# Patient Record
Sex: FEMALE | Race: WHITE | NOT HISPANIC OR LATINO | URBAN - METROPOLITAN AREA
[De-identification: names, ages, dates, MRNs, and addresses within clinical notes are randomized per-mention and may not be internally consistent; named-entity substitution may affect disease eponyms.]

---

## 2023-05-11 ENCOUNTER — OFFICE VISIT (OUTPATIENT)
Dept: URBAN - METROPOLITAN AREA CLINIC 78 | Facility: CLINIC | Age: 58
End: 2023-05-11
Payer: SELF-PAY

## 2023-05-11 VITALS
SYSTOLIC BLOOD PRESSURE: 100 MMHG | HEART RATE: 76 BPM | WEIGHT: 124.8 LBS | RESPIRATION RATE: 16 BRPM | TEMPERATURE: 98.2 F | BODY MASS INDEX: 22.97 KG/M2 | DIASTOLIC BLOOD PRESSURE: 66 MMHG | HEIGHT: 62 IN

## 2023-05-11 DIAGNOSIS — D50.8 ACQUIRED IRON DEFICIENCY ANEMIA DUE TO DECREASED ABSORPTION: ICD-10-CM

## 2023-05-11 DIAGNOSIS — R10.84 ABDOMINAL CRAMPING, GENERALIZED: ICD-10-CM

## 2023-05-11 DIAGNOSIS — R43.2 DYSGEUSIA: ICD-10-CM

## 2023-05-11 DIAGNOSIS — R63.4 UNINTENTIONAL WEIGHT LOSS: ICD-10-CM

## 2023-05-11 PROBLEM — 79890006: Status: ACTIVE | Noted: 2023-05-11

## 2023-05-11 PROBLEM — 271801002: Status: ACTIVE | Noted: 2023-05-11

## 2023-05-11 PROCEDURE — 99244 OFF/OP CNSLTJ NEW/EST MOD 40: CPT | Performed by: INTERNAL MEDICINE

## 2023-05-11 PROCEDURE — 99204 OFFICE O/P NEW MOD 45 MIN: CPT | Performed by: INTERNAL MEDICINE

## 2023-05-11 RX ORDER — OMEPRAZOLE 40 MG/1
1 CAPSULE 30 MINUTES BEFORE MORNING MEAL CAPSULE, DELAYED RELEASE ORAL ONCE A DAY
Qty: 30 | Refills: 1 | OUTPATIENT
Start: 2023-05-11

## 2023-05-11 NOTE — HPI-TODAY'S VISIT:
The patient was referred to us by Ruth Ann Pantoja NP  unintentional weight loss. A copy of this note will be sent to the referring physician.   Since June 2022 she had had dysgeusia with consequent anorexia and unintentional weight loss of ~ 70 lbs in the past 8 months.  She was also recently noted to be anemic. She is on Fe supplementation. She has a high Ferritin though.  She has intermittent abdominal pain.  There is no recent history of rectal bleeding or melena. The patient has no pertinent additional complaints of constipation, diarrhea or bloating.  Regarding any upper GI complaints, the patient has not had heartburn, nausea, vomiting or dysphagia.   The patient does not take blood thinners.  They deny any CP or HADLEY.  The patient has never had a colonoscopy previously. There is no FH of colon cancer or colon polyps.   Summary of prior work-up: - Labs on 1/2023: Triglycerides 95, total cholesterol 138, albumin 3.4, total bilirubin 0.5, total protein 7.3, alkaline phosphatase 80, AST 19, ALT 19, glucose 85, potassium 4.2, BUN 8, sodium 140.  Hepatitis C antibody nonreactive

## 2023-05-11 NOTE — PHYSICAL EXAM GASTROINTESTINAL
Abdomen , soft, mild tenderness along the epigastrium, nondistended , no guarding or rigidity , no masses palpable , normal bowel sounds , Liver and Spleen , no hepatomegaly present , no hepatosplenomegaly , liver nontender , spleen not palpable

## 2023-07-31 ENCOUNTER — TELEPHONE ENCOUNTER (OUTPATIENT)
Dept: URBAN - METROPOLITAN AREA CLINIC 78 | Facility: CLINIC | Age: 58
End: 2023-07-31

## 2023-08-18 ENCOUNTER — CLAIMS CREATED FROM THE CLAIM WINDOW (OUTPATIENT)
Dept: URBAN - METROPOLITAN AREA MEDICAL CENTER 28 | Facility: MEDICAL CENTER | Age: 58
End: 2023-08-18

## 2023-08-18 ENCOUNTER — CLAIMS CREATED FROM THE CLAIM WINDOW (OUTPATIENT)
Dept: URBAN - METROPOLITAN AREA MEDICAL CENTER 28 | Facility: MEDICAL CENTER | Age: 58
End: 2023-08-18
Payer: SELF-PAY

## 2023-08-18 DIAGNOSIS — D64.89 ANEMIA DUE TO OTHER CAUSE: ICD-10-CM

## 2023-08-18 PROCEDURE — 99254 IP/OBS CNSLTJ NEW/EST MOD 60: CPT | Performed by: INTERNAL MEDICINE

## 2023-08-20 ENCOUNTER — OUT OF OFFICE VISIT (OUTPATIENT)
Dept: URBAN - METROPOLITAN AREA MEDICAL CENTER 28 | Facility: MEDICAL CENTER | Age: 58
End: 2023-08-20

## 2023-08-20 ENCOUNTER — CLAIMS CREATED FROM THE CLAIM WINDOW (OUTPATIENT)
Dept: URBAN - METROPOLITAN AREA MEDICAL CENTER 28 | Facility: MEDICAL CENTER | Age: 58
End: 2023-08-20
Payer: SELF-PAY

## 2023-08-20 DIAGNOSIS — D64.89 ANEMIA DUE TO OTHER CAUSE: ICD-10-CM

## 2023-08-20 PROCEDURE — 99231 SBSQ HOSP IP/OBS SF/LOW 25: CPT | Performed by: INTERNAL MEDICINE

## 2023-08-21 ENCOUNTER — OUT OF OFFICE VISIT (OUTPATIENT)
Dept: URBAN - METROPOLITAN AREA MEDICAL CENTER 28 | Facility: MEDICAL CENTER | Age: 58
End: 2023-08-21
Payer: SELF-PAY

## 2023-08-21 DIAGNOSIS — D12.3 ADENOMA OF TRANSVERSE COLON: ICD-10-CM

## 2023-08-21 DIAGNOSIS — K29.60 ADENOPAPILLOMATOSIS GASTRICA: ICD-10-CM

## 2023-08-21 DIAGNOSIS — D50.9 ANEMIA: ICD-10-CM

## 2023-08-21 PROCEDURE — 45385 COLONOSCOPY W/LESION REMOVAL: CPT | Performed by: INTERNAL MEDICINE

## 2023-08-21 PROCEDURE — 43239 EGD BIOPSY SINGLE/MULTIPLE: CPT | Performed by: INTERNAL MEDICINE

## 2023-09-08 ENCOUNTER — OFFICE VISIT (OUTPATIENT)
Dept: URBAN - METROPOLITAN AREA TELEHEALTH 2 | Facility: TELEHEALTH | Age: 58
End: 2023-09-08

## 2023-09-08 VITALS — HEIGHT: 62 IN | WEIGHT: 125 LBS | BODY MASS INDEX: 23 KG/M2

## 2023-09-08 RX ORDER — OMEPRAZOLE 40 MG/1
1 CAPSULE 30 MINUTES BEFORE MORNING MEAL CAPSULE, DELAYED RELEASE ORAL ONCE A DAY
Qty: 30 | Refills: 1 | COMMUNITY
Start: 2023-05-11

## 2023-09-21 ENCOUNTER — OFFICE VISIT (OUTPATIENT)
Dept: URBAN - METROPOLITAN AREA LAB 1 | Facility: LAB | Age: 58
End: 2023-09-21

## 2023-09-22 ENCOUNTER — OFFICE VISIT (OUTPATIENT)
Dept: URBAN - METROPOLITAN AREA TELEHEALTH 2 | Facility: TELEHEALTH | Age: 58
End: 2023-09-22
Payer: SELF-PAY

## 2023-09-22 VITALS
DIASTOLIC BLOOD PRESSURE: 63 MMHG | SYSTOLIC BLOOD PRESSURE: 105 MMHG | HEIGHT: 62 IN | HEART RATE: 66 BPM | BODY MASS INDEX: 20.98 KG/M2 | WEIGHT: 114 LBS

## 2023-09-22 DIAGNOSIS — D50.9 ANEMIA: ICD-10-CM

## 2023-09-22 DIAGNOSIS — Z86.010 PERSONAL HISTORY OF COLONIC POLYPS: ICD-10-CM

## 2023-09-22 DIAGNOSIS — R43.2 DYSGEUSIA: ICD-10-CM

## 2023-09-22 DIAGNOSIS — R79.89 ELEVATED FERRITIN: ICD-10-CM

## 2023-09-22 DIAGNOSIS — R10.84 ABDOMINAL CRAMPING, GENERALIZED: ICD-10-CM

## 2023-09-22 DIAGNOSIS — R10.9 ABDOMINAL PAIN: ICD-10-CM

## 2023-09-22 PROBLEM — 428283002: Status: ACTIVE | Noted: 2023-09-22

## 2023-09-22 PROCEDURE — 99213 OFFICE O/P EST LOW 20 MIN: CPT | Performed by: INTERNAL MEDICINE

## 2023-09-22 PROCEDURE — 99213 OFFICE O/P EST LOW 20 MIN: CPT | Performed by: PHYSICIAN ASSISTANT

## 2023-09-22 RX ORDER — OMEPRAZOLE 40 MG/1
1 CAPSULE 30 MINUTES BEFORE MORNING MEAL CAPSULE, DELAYED RELEASE ORAL ONCE A DAY
Qty: 30 | Refills: 1 | Status: DISCONTINUED | COMMUNITY
Start: 2023-05-11

## 2023-09-22 NOTE — HPI-ZZZTODAY'S VISIT
Patient was admitted to the hospital in August with anemia.  She has a newly diagnosed lung mass and pleural effusion.  The CT scan showed a similar very large heterogeneously enhancing mass occupying the near entirety of the mid and lower left hemithorax.  Similar left to right mediastinal cardiac midline shift.  Her left pleural effusion had somewhat improved but no PE noted.  She had an upper endoscopy and colonoscopy in the hospital for the anemia.  Upper endoscopy just showed some hematin in the gastric antrum otherwise normal.  Colonoscopy showed 1 polyp in the transverse colon and internal hemorrhoids.  Pathology showed some chronic inactive gastritis and a tubular adenoma recommending repeat colonoscopy in 3 years.  The prep was fair with extensive washing to improve the prep but recommended 3 years secondary to polyps could have been missed. she had surgery on Sept 8th to have the mass removed - did not have to remove the whole lung - just part of it. doing pretty well post op she states her stomach feels good no nausea or emesis she is having BM  she is eating some better - tastes the food better - no adversion to food anymore

## 2023-09-23 ENCOUNTER — DASHBOARD ENCOUNTERS (OUTPATIENT)
Age: 58
End: 2023-09-23